# Patient Record
(demographics unavailable — no encounter records)

---

## 2025-01-28 NOTE — HISTORY OF PRESENT ILLNESS
[] : years ago [Urinary Frequency] : no [Feelings Of Urinary Urgency] : no [Urinary Tract Infection] : no [Uses ___ pads per day] : uses [unfilled] pad(s) per day [de-identified] : daily, laugh, cough, sneeze, lifting [de-identified] : daily  [de-identified] : sometimes x 1 [de-identified] : i the AM  [de-identified] : + sexually active [FreeTextEntry1] : Patient had a pelvic ultrasound in September which revealed a uterus 7.9 x 4.5 x 4.8 cm endometrial lining was 3.6 mm

## 2025-01-28 NOTE — PHYSICAL EXAM
[Chaperone Present] : A chaperone was present in the examining room during all aspects of the physical examination [74092] : A chaperone was present during the pelvic exam. [Well developed] : well developed [Well Nourished] : ~L well nourished [Normal Appearance] : general appearance was normal [Normal] : normal [Post Void Residual ____ml] : post void residual was [unfilled] ml [FreeTextEntry2] : [place] [Anxiety] : patient is not anxious [Tenderness] : ~T no ~M abdominal tenderness observed [Distended] : not distended [FreeTextEntry3] : Positive hypermobility

## 2025-01-28 NOTE — HISTORY OF PRESENT ILLNESS
[] : years ago [Urinary Frequency] : no [Feelings Of Urinary Urgency] : no [Urinary Tract Infection] : no [Uses ___ pads per day] : uses [unfilled] pad(s) per day [de-identified] : daily, laugh, cough, sneeze, lifting [de-identified] : daily  [de-identified] : sometimes x 1 [de-identified] : i the AM  [de-identified] : + sexually active [FreeTextEntry1] : Patient had a pelvic ultrasound in September which revealed a uterus 7.9 x 4.5 x 4.8 cm endometrial lining was 3.6 mm

## 2025-01-28 NOTE — PHYSICAL EXAM
[Chaperone Present] : A chaperone was present in the examining room during all aspects of the physical examination [84181] : A chaperone was present during the pelvic exam. [Well developed] : well developed [Well Nourished] : ~L well nourished [Normal Appearance] : general appearance was normal [Normal] : normal [Post Void Residual ____ml] : post void residual was [unfilled] ml [FreeTextEntry2] : [place] [Anxiety] : patient is not anxious [Tenderness] : ~T no ~M abdominal tenderness observed [Distended] : not distended [FreeTextEntry3] : Positive hypermobility

## 2025-01-28 NOTE — DISCUSSION/SUMMARY
[FreeTextEntry1] : We discussed that urine dipstick showed trace blood she reports having a history of microscopic hematuria and has had an evaluation for such several years ago.  We discussed sending off a urinalysis with microscopy and culture She presents with mixed urinary incontinence with predominant stress urinary incontinence.  Treatment options for the stress incontinence were discussed and included doing nothing, behavioral modification, Kegel's exercises with and without PT, medications, a pessary or intravaginal devices, periurethral bulking, and surgical correction with a suburethral sling v Segura v autologous sling. We also discussed possible overactive bladder and treatment options.  We discussed urodynamic testing if she is interested in surgical correction. She would like to start with conservative management and border device on [employer] for Kegel exercises she would also like to do physical therapy.  We discussed follow-up in the office in approximately 6 months or earlier on an as-needed basis.  [facility] patient information on stress incontinence and overactive bladder was given to her.  All questions were answered

## 2025-01-28 NOTE — DISCUSSION/SUMMARY
[FreeTextEntry1] : We discussed that urine dipstick showed trace blood she reports having a history of microscopic hematuria and has had an evaluation for such several years ago.  We discussed sending off a urinalysis with microscopy and culture She presents with mixed urinary incontinence with predominant stress urinary incontinence.  Treatment options for the stress incontinence were discussed and included doing nothing, behavioral modification, Kegel's exercises with and without PT, medications, a pessary or intravaginal devices, periurethral bulking, and surgical correction with a suburethral sling v Segura v autologous sling. We also discussed possible overactive bladder and treatment options.  We discussed urodynamic testing if she is interested in surgical correction. She would like to start with conservative management and border device on [employer] for Kegel exercises she would also like to do physical therapy.  We discussed follow-up in the office in approximately 6 months or earlier on an as-needed basis.  [facility] patient information on stress incontinence and overactive bladder was given to her.  All questions were answered

## 2025-07-10 NOTE — PLAN
[FreeTextEntry1] : Pt's quest's @ [facility] safety & [facility] for brst u/s d/w pt. Pap screening also disc.

## 2025-07-10 NOTE — PHYSICAL EXAM
[No Lymphadenopathy] : no lymphadenopathy [Soft] : soft [Non-tender] : non-tender [No HSM] : No HSM [No Mass] : no mass [Atrophy] : atrophy [Nl Sphincter Tone] : normal sphincter tone [MA] : MA [FreeTextEntry2] : Janeth [FreeTextEntry3] : No thyroid nodules [Examination Of The Breasts] : a normal appearance [No Masses] : no breast masses were palpable [Labia Majora] : normal [Labia Minora] : normal [Normal] : normal [Normal Position] : in a normal position [Enlarged ___ wks] : not enlarged [Uterine Adnexae] : normal [FreeTextEntry1] : Changes c/w LS on ant vulva w/ symmetric blanching & minimal adhesion formation. [FreeTextEntry4] : Little prolapse w/ a mild cough. Pt did not want to push harder. Unable to recruit levator muscles on request. [FreeTextEntry9] : No masses

## 2025-07-10 NOTE — HISTORY OF PRESENT ILLNESS
[Currently Active] : currently active [Men] : men [TextBox_4] :  (2 NVD's & 2 spont ab's) Older daughter is fine; younger has special needs - wheelchair, non-verbal, mitochondrial disease. Postmenopausal patient presents for gynecologic care. Feels well. No gyn complaints. Normal bowel function. Hx of urine loss - has had  eval. Also has lichen sclerosus. Has Clobetasol ointment. Would like an vag estr crm rx. 2024 CT for RUQ pain.  2.6 cm lower segment fibroid was an incidental finding [Mammogramdate] : 10/25/2023 [TextBox_19] : Flaquito lifetime risk: 8.5%.  Scattered fibroglandular density with no evidence of malignancy. [BreastSonogramDate] : 10/25/2023 [TextBox_25] : Negative [PapSmeardate] : 4/20/2022 [TextBox_31] : NILM; HPV neg  [BoneDensityDate] : 10/25/2023 [TextBox_37] : T-score +1.1 at L1-4.  Left femoral neck +0.1.  Left hip +1.3.  Normal BMD [ColonoscopyDate] : 5/7/2024 [TextBox_43] : A single 3 mm hyperplastic sigmoid polyp.  Colon was otherwise unremarkable.  Dr. BLANCA Coleman.  Repeat in 5 years [FreeTextEntry2] :